# Patient Record
Sex: FEMALE | Race: WHITE | NOT HISPANIC OR LATINO | URBAN - METROPOLITAN AREA
[De-identification: names, ages, dates, MRNs, and addresses within clinical notes are randomized per-mention and may not be internally consistent; named-entity substitution may affect disease eponyms.]

---

## 2022-08-05 ENCOUNTER — VIRTUAL VISIT (OUTPATIENT)
Dept: CARDIOLOGY | Facility: CLINIC | Age: 32
End: 2022-08-05
Attending: GENETIC COUNSELOR, MS
Payer: COMMERCIAL

## 2022-08-05 DIAGNOSIS — Z84.89 FAMILY HISTORY OF SUDDEN DEATH: Primary | ICD-10-CM

## 2022-08-05 PROCEDURE — 96040 HC GENETIC COUNSELING, EACH 30 MINUTES: CPT | Mod: GT,95 | Performed by: GENETIC COUNSELOR, MS

## 2022-08-05 RX ORDER — LEVOTHYROXINE SODIUM 100 UG/1
TABLET ORAL
COMMUNITY
Start: 2021-12-29

## 2022-08-05 NOTE — NURSING NOTE
Chief Complaint   Patient presents with     Consult     No questions while rooming, no other vitals to report for todays visit      Maribel Saucedo, UBALDO/CMA

## 2022-08-05 NOTE — LETTER
Date:August 16, 2022      Patient was self referred, no letter generated. Do not send.        [facility] Health Information

## 2022-08-05 NOTE — PROGRESS NOTES
"Allie is a 31 year old who is being evaluated via a billable video visit.      How would you like to obtain your AVS? Mail a copy  If the video visit is dropped, the invitation should be resent by: Send to e-mail at: [email or web address]  Will anyone else be joining your video visit? pts mother in law Beatriz Jangsarah Saucedo, VF/CMA    PROVIDER APPOINTMENT  Here is a copy of the progress note from your recent genetic counseling visit through the [facility] on Date: 2022.  Due to Covid-19 pandemic, this appointment was moved to a virtual visit.    PROGRESS NOTE:Allie was seen for genetic counseling due to the history of sudden death in her  Jeevan.  I had the opportunity to talk with Allie and Jeevan's mother Beatriz today to discuss the genetic component of sudden cardiac arrests (SCA) and testing options available to them.     MEDICAL HISTORY:Allie is in good health.     Allie's  Jeevan  suddenly on 20 at 31 years of age.  Autopsy showed cardiomegaly (550g). There were no other changes to the heart, no coronary artery disease, and no other cause of death noted.  Jeevan had been in good health.  He was 6'9\".  He had a collapsed lung and pneumonia at 11 years of age.  He had a staph infection in  and MERSA in .  He was not taking medications at the time of his death.    FAMILY HISTORY:A detailed family history of Jeevan's extended family was obtained during today's consult.  Family history was significant for the following cardiac history:    Jeevan's mother Beatriz is in good health but has family members with arrhythmias.    Beatriz's paternal first cousin, Shima, had a cardiac arrest and was diagnosed with Brugada syndrome.  He had genetic testing that revealed a variant of unknown significance (VUS) in the SCN5A gene. A vus means that we do not have enough information to determine if the variant is disease causing or not. "  Since Shima's testing, other family members, including his mother, Beatriz's father and Beatriz have all been tested and do NOT carry the SCN5A variant.  Therefore, we know that Jeevan's death is not related to that specific SCN5A variant.     Beatriz's paternal uncle  suddenly in his sleep in his 70's.  He was known to have heart issues.  He has a daughter Trisha who has a device implanted.  He has a son Arsalan who's granddaughter  suddenly in her 20's, although it is unclear if that was related to an overdose.    Jeevan has one daughter in good health.    Jeevan has one brother in good health.  There is no additional history of cardiomyopathy, arrhythmias, heart attacks, fainting, sudden cardiac death, genetic conditions, or birth defects. (A copy of pedigree may be found under media tab).    DISCUSSION: Explained that cardiac arrest can occur for lots of different reasons, both genetic and non-genetic.  When there is no structural cardiac abnormalities the possibility of a genetic arrhythmia is raised. Jeevan's autopsy (attached in [employer] message on 22) found cardiomegaly and raised the possibility of a genetic arrhythmia.      Genetic testing for arrhythmias is available through commercial testing laboratories. It looks at genes associated with LQTS, Brugada syndrome, CPVT, short QT syndrome, and arrhythmogenic cardiomyopathy.  Testing sequences these genes and also looks for deletions or duplications of the DNA.  Although a distant relative (Shima) carries a VUS in a gene (SCN5A) associated with both Brugada syndrome and LQTS, Jeevan is NOT at risk for this same variant, because his mother and grandfather do NOT carry the SCN5A vus.  That does not rule out the possibility of a genetic arrhythmia in Jeevan.     Since there can be overlap between arrhythmias and cardiomyopathies and the diagnosis of some cardiomyopathies can be very difficult, we also discussed the option of a comprehensive cardio  panel.  These panels look at genes associated with both arrhythmia and cardiomyopathy.       Explained that the detection rate for identifying a mutation varies depending on the condition, but can range from about 15% with Brugada syndrome to 70% with LQTS.  Most cardiomyopathies have a detection rate around 50%.     Reviewed autosomal dominant (AD) inheritance pattern most commonly associated with inherited cardiac conditions, including the 50% risk for recurrence.  Explained that cardiac gene mutations are associated with reduced penetrance and variable expressivity, meaning that individuals who carry a gene mutation may or may not get the disease and onset and severity can vary from one family member to the next. This explains why you may not see symptoms in each generation of a family.     Reviewed capabilities, limitations, and logistics of testing.  A blood sample collected at the time of the autopsy could be sent to testing lab for evaluation of selected genes. The results could directly impact care and treatment for family members.  Test results take approximately 2-4 weeks on average. Discussed cost of testing through commercial labs - $250 self pay price.    Explained three possible outcomes of genetic testing including: positive identification of a mutation, no mutation identified, and identification of a variant of unknown significance (VUS). If a mutation, also known as a pathogenic variant, is identified, presymptomatic testing would be available to at risk family members. If no mutation is identified, it does not rule out the possibility of a genetic component to this disease. Family members could still be at risk for developing the same condition. If a VUS is identified, it is unclear if the mutation is disease causing or just a normal variation. It may take time and possibly additional testing to determine the meaning of a VUS result.     Discussed pros and cons of genetic testing. Explained that  results could determine the cause for a sudden cardiac arrest.  In addition, identifying a pathogenic variant would help cardiologist establish a more accurate treatment plan for family members.  If a mutation is identified, presymptomatic testing is available to all at risk relatives.     Explained that clinical evaluation is recommended for all first degree relatives (parents, siblings, and children) of an affected individual regardless of decision to pursue genetic testing.  Clinical evaluation may include history, cardiac exam, EKG, ECHO, heart monitoring, and exercise stress testing.    All questions answered at this time.     PLAN: Allie and Beatriz would like to move ahead with genetic testing.  Allie had talked with [facility] a few months ago and they stated they still had blood and tissue sample available.  She will send me a copy of the autopsy report so I can review and reach out to their office to confirm sample type and availability.  If sample is available, we will proceed with genetic testing for a full cardio panel.  I will confirm specific panel with Allie once all of the other details are in place.  We reviewed consent form and verbal permission was given.      When results are available, we will set up a time for Beatriz Kelley and I to review.    TOTAL TIME SPENT IN COUNSELIN minutes    Herminia Bowie MS, [facility]  Licensed, Certified Genetic Counselor  [facility]

## 2022-08-05 NOTE — PATIENT INSTRUCTIONS
"Indication for Genetic Counseling:     Cardiac arrests can occur for lots of different reasons, both genetic and non-genetic.  When there is no structural cardiac abnormalities the possibility of a genetic arrhythmia is raised.      Genetic testing for arrhythmias is available through commercial testing laboratories. It looks at genes associated with LQTS, Brugada syndrome, CPVT, short QT syndrome, and arrhythmogenic cardiomyopathy.  Testing sequences these genes and also looks for deletions or duplications of the DNA.       Since there can be overlap between arrhythmias and cardiomyopathies and the diagnosis of some cardiomyopathies can be very difficult, we also discussed the option of a comprehensive cardio panel. These panels look at genes associated with both arrhythmia and cardiomyopathy.       Explained that the detection rate for identifying a mutation varies depending on the condition, but can range from about 15% with Brugada syndrome to 70% with LQTS.     Inheritance:   Humans have over 20,000 genes that instruct our bodies how to function.  We have two copies of each gene because we inherit one from our mother and one from our father.  In most cardiac cases with a genetic component, the condition is inherited in an autosomal dominant (AD) pattern.  This means that in order to have the condition, a person needs to inherit a mutation on one copy of a particular gene.  This mutation or pathogenic variant dominates the \"normal\" working copy of the gene.  When an affected individual has children, they can either pass on the \"normal\" copy of the gene or the mutation.  Therefore, children have a 50% chance of inheriting the mutation.  Other family members also have an increased risk but the specific risk depends on the degree of relationship.  Additional inheritance patterns can occur within families and may alter the risk of recurrence.      Testing Options:   Genetic testing is available to assess a panel of " genes known to cause this condition.  This test reads through the DNA (sequencing) of these genes to look for spelling mistakes or mutations that could cause the condition.       There are three types of results you could receive from this test.     -Positive result (mutation/pathogenic variant identified) - confirms diagnosis and provides an answer to why this happened.  In addition, identifying a mutation allows family members to have testing to determine their risk.     -Negative result (mutation not identified) - no genetic changes were identified.  This does not rule out a genetic cause for the condition as the genetic testing only identifies a portion of genetic causes for this condition.    -Variant of uncertain significance (VUS) - a genetic change was identified, but there is not enough information to determine whether it is disease-causing or normal human genetic variation.      Although genetic testing may identify a mutation, it cannot provide information about the severity of symptoms or the progression of disease.  We cannot predict age of onset or severity of symptoms due to reduced penetrance and variable expressivity.     Logistics:   Genetic testing involves collecting a sample of DNA, thru blood, saliva, or cheek cells.  The sample will be sent to a laboratory to extract the DNA and sequence the genes for mutations.  The laboratory will work with your insurance company to determine the out of pocket (OOP) cost and will notify you if the OOP cost is greater than $100.  Remember to ask the lab about financial assistance pricing and self pay options as well.  Sometimes those are much lower than insurance pricing.  When testing is initiated, results take about 2-4 weeks to return. I will contact you over the phone when results are available.      Genetic Information and Nondiscrimination Act:  The Genetic Information and Nondiscrimination Act of 2008 (KIMANI) is a federal law that protects individuals  from genetic discrimination in health insurance and employment. Genetic discrimination is defined as the misuse of genetic information. This law does not address potential discrimination regarding life insurance or disability insurance.       This is especially relevant for at risk individuals who are considering presymptomatic testing.     Screening Recommendations:  Explained that clinical evaluation is recommended for all first degree relatives (parents, siblings, and children) of an affected individual regardless of decision to pursue genetic testing.  Clinical evaluation may include history, cardiac exam, EKG, event monitoring, and exercise stress testing.     Individuals who carry a gene for LQTS or are at increased risk for developing LQTS should avoid medications that can increase risk of a cardiac event.     Resources:  Sudden Arrhythmia Death Syndromes Foundation - sads.org  Heart Rhythm Society - HRSonline.org  [employer] Education phone dontae (for a list of medications to avoid with LQTS)     General   American Heart Association - americanheart.org  Genetics Home Reference - ghr.nlm.nih.gov  Genetic Information and Nondiscrimination Act - [place] - It's what's in storenahelp.org     Contact Information:  Herminia Bowie MS  Licensed Genetic Counselor  [facility]  [facility]     Office:  [phone]  Appointments:  [phone]  Fax: [phone]  Email: [email or web address]

## 2022-08-05 NOTE — LETTER
"2022      RE: Allie Maya  [address] [facility] [address]       Dear Colleague,    Thank you for the opportunity to participate in the care of your patient, Allie Maya, at the [facility] [place] at [facility]. Please see a copy of my visit note below.    Allie is a 31 year old who is being evaluated via a billable video visit.      How would you like to obtain your AVS? Mail a copy  If the video visit is dropped, the invitation should be resent by: Send to e-mail at: [email or web address]  Will anyone else be joining your video visit? pts mother in law Beatriz  Maribel Saucedo, UBALDO/NICOLETTE    PROVIDER APPOINTMENT  Here is a copy of the progress note from your recent genetic counseling visit through the [facility] on Date: 2022.  Due to Covid-19 pandemic, this appointment was moved to a virtual visit.    PROGRESS NOTE:Allie was seen for genetic counseling due to the history of sudden death in her  Jeevan.  I had the opportunity to talk with Allie and Jeevan's mother Beatriz today to discuss the genetic component of sudden cardiac arrests (SCA) and testing options available to them.     MEDICAL HISTORY:Allie is in good health.     Allie's  Jeevan  suddenly on 20 at 31 years of age.  Autopsy showed cardiomegaly (550g). There were no other changes to the heart, no coronary artery disease, and no other cause of death noted.  Jeevan had been in good health.  He was 6'9\".  He had a collapsed lung and pneumonia at 11 years of age.  He had a staph infection in  and MERSA in .  He was not taking medications at the time of his death.    FAMILY HISTORY:A detailed family history of Jeevan's extended family was obtained during today's consult.  Family history was significant for the following cardiac history:    Jeevan's mother Beatriz is in good health " but has family members with arrhythmias.    Beatriz's paternal first cousin, Shima, had a cardiac arrest and was diagnosed with Brugada syndrome.  He had genetic testing that revealed a variant of unknown significance (VUS) in the SCN5A gene. A vus means that we do not have enough information to determine if the variant is disease causing or not.  Since Shima's testing, other family members, including his mother, Beatriz's father and Beatriz have all been tested and do NOT carry the SCN5A variant.  Therefore, we know that Deannas death is not related to that specific SCN5A variant.     Beatriz's paternal uncle  suddenly in his sleep in his 70's.  He was known to have heart issues.  He has a daughter Trisha who has a device implanted.  He has a son Arsalan who's granddaughter  suddenly in her 20's, although it is unclear if that was related to an overdose.    Jeevan has one daughter in good health.    Jeevan has one brother in good health.  There is no additional history of cardiomyopathy, arrhythmias, heart attacks, fainting, sudden cardiac death, genetic conditions, or birth defects. (A copy of pedigree may be found under media tab).    DISCUSSION: Explained that cardiac arrest can occur for lots of different reasons, both genetic and non-genetic.  When there is no structural cardiac abnormalities the possibility of a genetic arrhythmia is raised. Jeevan's autopsy (attached in [employer] message on 22) found cardiomegaly and raised the possibility of a genetic arrhythmia.      Genetic testing for arrhythmias is available through commercial testing laboratories. It looks at genes associated with LQTS, Brugada syndrome, CPVT, short QT syndrome, and arrhythmogenic cardiomyopathy.  Testing sequences these genes and also looks for deletions or duplications of the DNA.  Although a distant relative (Shima) carries a VUS in a gene (SCN5A) associated with both Brugada syndrome and LQTS, Jeevan is NOT at risk for  this same variant, because his mother and grandfather do NOT carry the SCN5A vus.  That does not rule out the possibility of a genetic arrhythmia in Jeevan.     Since there can be overlap between arrhythmias and cardiomyopathies and the diagnosis of some cardiomyopathies can be very difficult, we also discussed the option of a comprehensive cardio panel.  These panels look at genes associated with both arrhythmia and cardiomyopathy.       Explained that the detection rate for identifying a mutation varies depending on the condition, but can range from about 15% with Brugada syndrome to 70% with LQTS.  Most cardiomyopathies have a detection rate around 50%.     Reviewed autosomal dominant (AD) inheritance pattern most commonly associated with inherited cardiac conditions, including the 50% risk for recurrence.  Explained that cardiac gene mutations are associated with reduced penetrance and variable expressivity, meaning that individuals who carry a gene mutation may or may not get the disease and onset and severity can vary from one family member to the next. This explains why you may not see symptoms in each generation of a family.     Reviewed capabilities, limitations, and logistics of testing.  A blood sample collected at the time of the autopsy could be sent to testing lab for evaluation of selected genes. The results could directly impact care and treatment for family members.  Test results take approximately 2-4 weeks on average. Discussed cost of testing through commercial labs - $250 self pay price.    Explained three possible outcomes of genetic testing including: positive identification of a mutation, no mutation identified, and identification of a variant of unknown significance (VUS). If a mutation, also known as a pathogenic variant, is identified, presymptomatic testing would be available to at risk family members. If no mutation is identified, it does not rule out the possibility of a genetic  component to this disease. Family members could still be at risk for developing the same condition. If a VUS is identified, it is unclear if the mutation is disease causing or just a normal variation. It may take time and possibly additional testing to determine the meaning of a VUS result.     Discussed pros and cons of genetic testing. Explained that results could determine the cause for a sudden cardiac arrest.  In addition, identifying a pathogenic variant would help cardiologist establish a more accurate treatment plan for family members.  If a mutation is identified, presymptomatic testing is available to all at risk relatives.     Explained that clinical evaluation is recommended for all first degree relatives (parents, siblings, and children) of an affected individual regardless of decision to pursue genetic testing.  Clinical evaluation may include history, cardiac exam, EKG, ECHO, heart monitoring, and exercise stress testing.    All questions answered at this time.     PLAN: Lenny would like to move ahead with genetic testing.  Allie had talked with [facility] office a few months ago and they stated they still had blood and tissue sample available.  She will send me a copy of the autopsy report so I can review and reach out to their office to confirm sample type and availability.  If sample is available, we will proceed with genetic testing for a full cardio panel.  I will confirm specific panel with Allie once all of the other details are in place.  We reviewed consent form and verbal permission was given.      When results are available, we will set up a time for Beatriz Kelley and I to review.    TOTAL TIME SPENT IN COUNSELIN minutes    Herminia Bowie MS, [facility]  Licensed, Certified Genetic Counselor  [facility]         Please do not hesitate to contact me if you have any questions/concerns.     Sincerely,     Herminia Bowie GC

## 2022-08-30 ENCOUNTER — TELEPHONE (OUTPATIENT)
Dept: CARDIOLOGY | Facility: CLINIC | Age: 32
End: 2022-08-30

## 2022-08-30 NOTE — TELEPHONE ENCOUNTER
Spoke with Allie today to finalize the details of genetic testing in her  , Jeevan.  Allie signed the consent form to release sample from [facility] forensic office to [facility] laboratory for genetic testing for cardiomyopathy and arrhythmia genes. Consent forwarded to [facility], care of Dr. Carrington Stewart.    [facility]ed [facility].  Discussed possible results, cost of testing, logistics, KIMANI, research component, and option for DNA banking if there is remaining sample. Allie gave verbal consent to proceed with genetic testing.  Requisition form completed through [employer] portal.  Spoke with Janelle, [facility] , to coordinate testing.  She will be in touch with Dr. Stewart directly to arrange for transfer of blood specimen.      All questions answered at this time.      Herminia Bowie MS, [facility]  Licensed, Certified Genetic Counselor  [facility]  [facility]

## 2022-10-03 ENCOUNTER — HEALTH MAINTENANCE LETTER (OUTPATIENT)
Age: 32
End: 2022-10-03

## 2022-11-16 ENCOUNTER — TELEPHONE (OUTPATIENT)
Dept: CARDIOLOGY | Facility: CLINIC | Age: 32
End: 2022-11-16

## 2022-12-20 NOTE — TELEPHONE ENCOUNTER
Spoke with Allie today to review results of genetic testing. Her  Jeevan  suddenly in 2020.  Allie elected to have genetic testing performed on a postmortem sample for the Comprehensive arrhythmia and cardiomyopathy panel panel. DNA was collected by [facility] and sent to [facility].     Testing revealed that Jeevan does NOT carry a disease causing mutation.      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  A variant of unknown significance means that there is a genetic change in which we do not have enough information to determine if it is disease causing or not. Labs review published data, functional studies, presences in population databases, similarity to normal sequence, and computer prediction models.      The POQJS1U gene is associated with autosomal dominant primary aldosteronism with seizures and neurologic abnormalities (PASNA), autosomal recessive sinoatrial node dysfunction and deafness (SANDD).  In addition, there is preliminary evidence supporting a correlation with dominantly inherited autism.  This particular variant creates an amino acid with similar properties. It has not been reported in the literature or found in population databases.  Computer models predict this variant will be tolerated. At this time there is not enough information to be certain if this variant alone can cause disease or not.     PASNA can be associated with malignant arrhythmias, however, there are several other symptoms associated with this condition.  Allie and Jeevan s mother Beatriz both report that Jeevan did not have hypertension or hypokalemia to their knowledge.  He did report muscle cramps with competitive athletics but did not have fatigue, weakness, tingling, excessive thirst, frequent urination, or visual disturbances, which can all be symptoms of PASNA.      Explained that recessive genes require two mutations to cause  disease.  Therefore, there is little concern for SANDD since Jeevan only carried one variant and it is not known to be diseae causing at this time.    Although we do not have any answer we can not rule out the potential risk to relatives.  If there was underlying dominant condition the risk could still be as high as 50% but genetic testing is not predictive or recommended because we cannot interpret the results and make predictions.      Since there is a family history of cardiac arrest and suspected arrhythmia, we also discussed the SCN5A VUS found in the family.  Clarified that Jeevan does NOT carry the SCN5A variant  found in Beatriz s cousin Shima.    Reviewed recommendation for cardiac screening in all first degree relatives (parents, siblings, children).  Clinical screening should include physical exam with a cardiologist, history, EKG, and ECHO. In addition, Holter montior and MRI may be recommended.    A summary letter and copy of the results will be sent to patient. Reviewed all of the information with Jeevan's mother Beatriz.  A copy of the letter will be sent to her as well, per Allie's request.  All questions answered at this time.    Herminia Bowie MS, CGC  Licensed, Certified Genetic Counselor  [facility]  [facility]

## 2023-10-22 ENCOUNTER — HEALTH MAINTENANCE LETTER (OUTPATIENT)
Age: 33
End: 2023-10-22

## 2024-12-14 ENCOUNTER — HEALTH MAINTENANCE LETTER (OUTPATIENT)
Age: 34
End: 2024-12-14